# Patient Record
Sex: FEMALE | Race: WHITE | Employment: OTHER | URBAN - METROPOLITAN AREA
[De-identification: names, ages, dates, MRNs, and addresses within clinical notes are randomized per-mention and may not be internally consistent; named-entity substitution may affect disease eponyms.]

---

## 2020-11-06 PROBLEM — I48.92 ATRIAL FLUTTER (HCC): Status: ACTIVE | Noted: 2020-11-06

## 2020-11-06 PROBLEM — I48.92 ATRIAL FLUTTER WITH RAPID VENTRICULAR RESPONSE (HCC): Status: ACTIVE | Noted: 2020-11-06

## 2020-11-06 PROBLEM — R07.9 CHEST PAIN: Status: ACTIVE | Noted: 2020-11-06

## 2022-10-30 PROBLEM — R07.9 CHEST PAIN: Status: RESOLVED | Noted: 2020-11-06 | Resolved: 2022-10-30

## 2022-10-31 ENCOUNTER — OFFICE VISIT (OUTPATIENT)
Age: 62
End: 2022-10-31
Payer: OTHER GOVERNMENT

## 2022-10-31 ENCOUNTER — HOSPITAL ENCOUNTER (OUTPATIENT)
Dept: INFUSION THERAPY | Age: 62
Discharge: HOME | End: 2022-10-31
Payer: OTHER GOVERNMENT

## 2022-10-31 VITALS
TEMPERATURE: 97.2 F | RESPIRATION RATE: 14 BRPM | OXYGEN SATURATION: 96 % | SYSTOLIC BLOOD PRESSURE: 141 MMHG | DIASTOLIC BLOOD PRESSURE: 72 MMHG | HEART RATE: 67 BPM | HEIGHT: 66 IN | BODY MASS INDEX: 44.52 KG/M2 | WEIGHT: 277 LBS

## 2022-10-31 VITALS — TEMPERATURE: 97.2 F

## 2022-10-31 DIAGNOSIS — E55.9 VITAMIN D DEFICIENCY: ICD-10-CM

## 2022-10-31 DIAGNOSIS — E03.8 OTHER SPECIFIED HYPOTHYROIDISM: ICD-10-CM

## 2022-10-31 DIAGNOSIS — D75.839 THROMBOCYTOSIS: ICD-10-CM

## 2022-10-31 DIAGNOSIS — E66.9 OBESE BODY HABITUS: ICD-10-CM

## 2022-10-31 DIAGNOSIS — D75.839 THROMBOCYTOSIS: Primary | ICD-10-CM

## 2022-10-31 DIAGNOSIS — E61.1 IRON DEFICIENCY: ICD-10-CM

## 2022-10-31 DIAGNOSIS — E53.8 VITAMIN B 12 DEFICIENCY: ICD-10-CM

## 2022-10-31 LAB
25(OH)D3 SERPL-MCNC: 18.1 NG/ML (ref 30–100)
BASOPHILS # BLD: 0.1 K/UL (ref 0–0.1)
BASOPHILS NFR BLD: 1 % (ref 0–2)
DIFFERENTIAL METHOD BLD: ABNORMAL
EOSINOPHIL # BLD: 0.1 K/UL (ref 0–0.4)
EOSINOPHIL NFR BLD: 1 % (ref 0–5)
ERYTHROCYTE [DISTWIDTH] IN BLOOD BY AUTOMATED COUNT: 13.2 % (ref 11.6–14.5)
FERRITIN SERPL-MCNC: 170 NG/ML (ref 8–388)
FOLATE SERPL-MCNC: 13.8 NG/ML (ref 3.1–17.5)
HCT VFR BLD AUTO: 42.5 % (ref 35–45)
HGB BLD-MCNC: 13.6 G/DL (ref 12–16)
IMM GRANULOCYTES # BLD AUTO: 0 K/UL (ref 0–0.04)
IMM GRANULOCYTES NFR BLD AUTO: 0 % (ref 0–0.5)
IRON SATN MFR SERPL: 34 % (ref 20–50)
IRON SERPL-MCNC: 105 UG/DL (ref 50–175)
LYMPHOCYTES # BLD: 2 K/UL (ref 0.9–3.6)
LYMPHOCYTES NFR BLD: 26 % (ref 21–52)
MCH RBC QN AUTO: 30.8 PG (ref 24–34)
MCHC RBC AUTO-ENTMCNC: 32 G/DL (ref 31–37)
MCV RBC AUTO: 96.2 FL (ref 78–100)
MONOCYTES # BLD: 0.7 K/UL (ref 0.05–1.2)
MONOCYTES NFR BLD: 10 % (ref 3–10)
NEUTS SEG # BLD: 4.9 K/UL (ref 1.8–8)
NEUTS SEG NFR BLD: 62 % (ref 40–73)
NRBC # BLD: 0 K/UL (ref 0–0.01)
NRBC BLD-RTO: 0 PER 100 WBC
PLATELET # BLD AUTO: 290 K/UL (ref 135–420)
PMV BLD AUTO: 12.6 FL (ref 9.2–11.8)
RBC # BLD AUTO: 4.42 M/UL (ref 4.2–5.3)
T4 FREE SERPL-MCNC: 1.2 NG/DL (ref 0.7–1.5)
TIBC SERPL-MCNC: 311 UG/DL (ref 250–450)
TSH SERPL DL<=0.05 MIU/L-ACNC: 4.04 UIU/ML (ref 0.36–3.74)
VIT B12 SERPL-MCNC: 310 PG/ML (ref 211–911)
WBC # BLD AUTO: 7.8 K/UL (ref 4.6–13.2)

## 2022-10-31 PROCEDURE — 83540 ASSAY OF IRON: CPT

## 2022-10-31 PROCEDURE — 99204 OFFICE O/P NEW MOD 45 MIN: CPT | Performed by: INTERNAL MEDICINE

## 2022-10-31 PROCEDURE — 82306 VITAMIN D 25 HYDROXY: CPT

## 2022-10-31 PROCEDURE — 3078F DIAST BP <80 MM HG: CPT | Performed by: INTERNAL MEDICINE

## 2022-10-31 PROCEDURE — 82728 ASSAY OF FERRITIN: CPT

## 2022-10-31 PROCEDURE — 3074F SYST BP LT 130 MM HG: CPT | Performed by: INTERNAL MEDICINE

## 2022-10-31 PROCEDURE — 36415 COLL VENOUS BLD VENIPUNCTURE: CPT

## 2022-10-31 PROCEDURE — 84439 ASSAY OF FREE THYROXINE: CPT

## 2022-10-31 PROCEDURE — 82607 VITAMIN B-12: CPT

## 2022-10-31 PROCEDURE — 85025 COMPLETE CBC W/AUTO DIFF WBC: CPT

## 2022-10-31 NOTE — PROGRESS NOTES
Hematology/Oncology  Progress Note    Name: Sally Miranda  Date: 10/31/2022  : 1960  Primary Care Provider: None    Ms. Pita Rinaldi is a 58y.o. year old female with qualitative platelet defect. .  Patient has no personal nor family history fo bleeding nor thrombosis. She has no history of MI nor of Stroke. She has the usual American diet and is rather sedentary in her life style. Current Therapy: diagnostic evaluation    Subjective: The past medical, surgical and social history has been reviewed and remains unchanged.    Past Medical History:   Diagnosis Date    Arthritis 2011    Depressed 2010    Hyperlipemia     Hypertension      Past Surgical History:   Procedure Laterality Date    HX  SECTION      x3    HX COLONOSCOPY  2016    No Polyps Found    HX HEENT  1968    Tonsillectomy    HX HYSTERECTOMY       Social History     Socioeconomic History    Marital status:      Spouse name: Not on file    Number of children: Not on file    Years of education: Not on file    Highest education level: Not on file   Occupational History    Not on file   Tobacco Use    Smoking status: Never    Smokeless tobacco: Never   Vaping Use    Vaping Use: Never used   Substance and Sexual Activity    Alcohol use: No    Drug use: No    Sexual activity: Yes     Partners: Male   Other Topics Concern    Not on file   Social History Narrative    Not on file     Social Determinants of Health     Financial Resource Strain: Not on file   Food Insecurity: Not on file   Transportation Needs: Not on file   Physical Activity: Not on file   Stress: Not on file   Social Connections: Not on file   Intimate Partner Violence: Not on file   Housing Stability: Not on file     Family History   Problem Relation Age of Onset    Hypertension Mother     Heart Disease Mother     Elevated Lipids Mother     Diabetes Mother     OSTEOARTHRITIS Mother     Heart Disease Father     Hypertension Sister     Elevated Lipids Sister     Cancer Sister     Cancer Maternal Grandmother      Current Outpatient Medications   Medication Sig Dispense Refill    dilTIAZem ER (CARDIZEM CD) 180 mg capsule Take 1 Capsule by mouth daily. 30 Capsule 1    apixaban (Eliquis) 5 mg tablet Take 1 Tablet by mouth two (2) times a day. 60 Tablet 1    atorvastatin (Lipitor) 20 mg tablet Take 20 mg by mouth daily. levothyroxine (synthroid) 50 mcg tablet Take 50 mcg by mouth Daily (before breakfast). vit A/vit C/vit E/zinc/copper (PRESERVISION AREDS PO) Take 1 Tab by mouth daily. buPROPion XL (WELLBUTRIN XL) 300 mg XL tablet Take 300 mg by mouth every morning. Review of Systems:    General :The patient has no complaints and there is no physical distress evident. Psychological : patient denies having any psychological symptoms such as hallucinations depression or anxiety. Ophthalmic:the patient denies having any visual impairment or eye discomfort. ENT: there are no abnormalities reported. Allergy and Immunology:the patient denies having any seasonal allergies or allergies to medications other than those already outlined above. Hematological and Lymphatic: the patient denies having any bruising, bleeding or lymphadenopathy. Endocrine: the patient denies having any heat or cold intolerance. There is no history of diabetes or thyroid disorders. Breast: the patient denies having any history of breast mass or lumps. Respiratory:the patient denies having any cough, shortness of breath, or dyspnea on exertion. Cardiovascular: there are no complaints of chest pain, palpitations, chest pounding, or dyspnea on exertion. Gastrointestinal: the patient denies having nausea, emesis, diarrhea, constipation, or blood in the stool. Genito-Urinary: the patient denies having urinary urgency, frequency, or dysuria. Musculoskeletal: with the exception of mild arthralgias the patient has no other musculoskeletal complaints. Neurological:  denies having any numbness, tingling, or neurologic deficits. Dermatological: patient denies having any unexplained rash, skin ulcerations, or hives. Objective:     Visit Vitals  BP (!) 141/72   Pulse 67   Temp 97.2 °F (36.2 °C) (Oral)   Resp 14   Ht 5' 6\" (1.676 m)   Wt 125.6 kg (277 lb)   SpO2 96%   BMI 44.71 kg/m²     Pain Score: 2    Physical Exam:     ECO    General: Well appearing, in NAD    Psychologic: mood and affect are appropriate, no anxiety or depression noted    Skin: examination of the skin reveals no bruising, rash or petechiae    HEENT: Normocephalic, atraumatic. Conjunctiva and sclera are clear. Pupils are equal, round and reactive to light. EOMs are intact. Neck: supple without lymphadenopathy, JVD or thyromegaly    Lymphatics: no palpable cervical, supraclavicular, infraclavicular, axillary or inguinal lymphadenopathy    Lungs: clear breath sounds bilaterally, no rhonchi or wheezes noted    Heart: Regular rate and rhythm, no murmurs, rubs or gallops, S1-S2 noted. Abdomen: soft, non-tender, non-distended, no HSM,    Extremities: without clubbing, cyanosis or edema    Neurologic: no focal deficits, steady gait, Alert and oriented x 3. Laboratory Data:     No results found for this or any previous visit. Patient Active Problem List   Diagnosis Code    Atrial flutter ([address] I48.92    Hyperlipemia E78.5    Hypertension I10         Assessment:     1. Thrombocytosis    2. Iron deficiency    3. Vitamin B 12 deficiency    4. Vitamin D deficiency    5. Other specified hypothyroidism    6. Obese body habitus        Plan:     Multiple blood tests today  Follow-up in 1 week's time with a telephone visit to formulate any further plans  Patient and her  are in agreement with this plan  Questions were posed and answered    Follow-up and Dispositions    Return in about 1 week (around 2022) for Follow-up telephone visit, Follow up with labs.         Orders Placed This Encounter    CBC WITH AUTOMATED DIFF     Standing Status:   Future     Number of Occurrences:   1     Standing Expiration Date:   11/1/2023    FERRITIN     Standing Status:   Future     Number of Occurrences:   1     Standing Expiration Date:   11/1/2023    VITAMIN D, 25 HYDROXY     Standing Status:   Future     Number of Occurrences:   1     Standing Expiration Date:   11/1/2023    VITAMIN B12 & FOLATE     Standing Status:   Future     Number of Occurrences:   1     Standing Expiration Date:   11/1/2023    TSH+FREE T4     Standing Status:   Future     Number of Occurrences:   1     Standing Expiration Date:   11/1/2023    PLATELET COUNT     Standing Status:   Future     Standing Expiration Date:   11/1/2023    PERIPHERAL SMEAR     Standing Status:   Future     Number of Occurrences:   1     Standing Expiration Date:   10/31/2023    IRON PROFILE     Standing Status:   Future     Number of Occurrences:   1     Standing Expiration Date:   11/1/2023       Dajuan Bower MD  10/31/2022

## 2022-10-31 NOTE — PROGRESS NOTES
[facility] Progress Note    Date: 2022    Name: Chauncey Amor    MRN: [id number]         : 1960    Peripheral Lab Draw      Ms. Lama to [facility], ambulatory at [phone] accompanied by self. Pt was assessed and education was provided. Ms. Roma Giles vitals were reviewed and patient was observed for 5 minutes prior to treatment. Visit Vitals  Temp 97.2 °F (36.2 °C)         Blood obtained peripherally from right arm x 1 attempt with butterfly needle and sent to lab per written orders. No bleeding or hematoma noted at site. Gauze and coban applied. Ms. Theresa Rubi tolerated the phlebotomy, and had no complaints. Patient armband removed and shredded. Ms. Theresa Rubi was discharged from [place] [address] in stable condition at 1400.      Preston Mullins Phlebotomist PCT  2022  2:02 PM

## 2022-11-02 LAB — PERIPHERAL SMEAR,PSM: NORMAL

## 2022-11-07 NOTE — PROGRESS NOTES
Alem Baker is a 58 y.o. female, evaluated via audio-only technology on 11/8/2022 for  qualitative platelet defect. .  Patient has no personal nor family history fo bleeding nor thrombosis. She has no history of MI nor of Stroke. She has the usual American diet and is rather sedentary in her life style. CBC Platelets and peripheral smear emily all normal    CMP is normal    Vit B 12 and Folate are normal    Iron stores are normal    Vit D is 18 a bit low. Patient will supplement    TSH is 4.04  patient follows with Dr Mae    No need for routine follow up. Glad to see the patient if any issues arise       . Assessment & Plan:   Diagnoses and all orders for this visit:    1. Thrombocytosis    2. Other specified hypothyroidism    3. Obese body habitus    Other orders  -     cholecalciferol (VITAMIN D3) (2,000 UNITS /50 MCG) cap capsule; Take 1 Capsule by mouth two (2) times a day for 360 days. Indications: low vitamin D levels      12  Subjective:       Prior to Admission medications    Medication Sig Start Date End Date Taking? Authorizing Provider   cholecalciferol (VITAMIN D3) (2,000 UNITS /50 MCG) cap capsule Take 1 Capsule by mouth two (2) times a day for 360 days. Indications: low vitamin D levels 11/8/22 11/3/23 Yes Drake Marcos MD   dilTIAZem ER (CARDIZEM CD) 180 mg capsule Take 1 Capsule by mouth daily. 10/30/22   Joey Munoz MD   apixaban (Eliquis) 5 mg tablet Take 1 Tablet by mouth two (2) times a day. 10/30/22   Joey Munoz MD   atorvastatin (Lipitor) 20 mg tablet Take 20 mg by mouth daily. Provider, Historical   levothyroxine (synthroid) 50 mcg tablet Take 50 mcg by mouth Daily (before breakfast). Provider, Historical   vit A/vit C/vit E/zinc/copper (PRESERVISION AREDS PO) Take 1 Tab by mouth daily. Provider, Historical   buPROPion XL (WELLBUTRIN XL) 300 mg XL tablet Take 300 mg by mouth every morning.     Provider, Historical     Past Medical History:   Diagnosis Date    Arthritis 2011    Depressed 2010    Hyperlipemia     Hypertension        ROS    Patient-Reported Systolic (Top): 287  Patient-Reported Diastolic (Bottom): 72  Patient-Reported Weight: 270     Kelly Armendariz was evaluated through a patient-initiated, synchronous (real-time) audio only encounter. She (or guardian if applicable) is aware that it is a billable service, which includes applicable co-pays, with coverage as determined by her insurance carrier. This visit was conducted with the patient's (and/or Todd Worthy guardian's) verbal consent. She has not had a related appointment within my department in the past 7 days or scheduled within the next 24 hours. The patient was located in a state where the provider was licensed to provide care. The patient was located at: Home: [facility] [phone]  The provider was located at:  Facility (Appt Department): [place]    Total Time: minutes: 21-30 minutes    Cordell Owens MD

## 2022-11-08 ENCOUNTER — VIRTUAL VISIT (OUTPATIENT)
Age: 62
End: 2022-11-08
Payer: OTHER GOVERNMENT

## 2022-11-08 DIAGNOSIS — D75.839 THROMBOCYTOSIS: Primary | ICD-10-CM

## 2022-11-08 DIAGNOSIS — E66.9 OBESE BODY HABITUS: ICD-10-CM

## 2022-11-08 DIAGNOSIS — E03.8 OTHER SPECIFIED HYPOTHYROIDISM: ICD-10-CM

## 2022-11-08 PROCEDURE — 99443 PR PHYS/QHP TELEPHONE EVALUATION 21-30 MIN: CPT | Performed by: INTERNAL MEDICINE

## 2022-11-08 RX ORDER — ACETAMINOPHEN 500 MG
2000 TABLET ORAL 2 TIMES DAILY
Qty: 180 CAPSULE | Refills: 3 | Status: SHIPPED | OUTPATIENT
Start: 2022-11-08 | End: 2023-11-03